# Patient Record
(demographics unavailable — no encounter records)

---

## 2024-11-13 NOTE — HISTORY OF PRESENT ILLNESS
[FreeTextEntry1] : Maggie is a 65-year-old female presenting today with a personal history of bilateral breast implants.  Most recent bilateral mammography and ultrasound was performed at [facility] on March 10, 2023, BI-RADS 2, heterogeneously dense breast.  More recently, on September 24, 2024 the patient underwent bilateral breast MRI showing an intact right breast saline implant, however left breast ruptured retro-pectoral saline implant was found. Her bilaterally retropectoral saline implants were placed in 1995. She presents today to discuss her recent MRI in the next steps. She has no breast complaints today. She does SBE. She has not noticed a change in her breast or a breast lump. She has not noticed a change in her nipple or nipple area. She has not noticed a change in the skin of the breast. She is not experiencing nipple discharge. She is experiencing left breast pain/discomfort, sporadic She has not noticed a lump or lymph node under the armpit.   BREAST CANCER RISK FACTORS Menarche: 12 LMP: 2000 Menopause: post  Grav: 0  Para: 0 Age at first live birth: n/a Nursed: n/a Hysterectomy: no  Oophorectomy: no  OCP: yes for 20 years stopped 30 years ago  HRT: no  Last pap/pelvic exam: 2023 WNL  Related family history: maternal cousin uterine cancer 63yo Ashkenazi: no  Eduardo Varghese v8 risk assessment: 7.2% Genetic testing: no  Bra size: 36C   Last mammogram: 03/10/2023     Location: [facility]  Report reviewed.     Images reviewed on PACS Results: BIRADS 2  The breast are heterogeneously dense. no evidence of malignancy    Last ultrasound: 03/10/2023      Location: [facility]  Report reviewed.     Images reviewed on PACS Results: BIRADS 2    no evidence of malignancy    Last MRI: 09/24/02024    Location: [facility]  Report reviewed. Results: BIRADS 2  Left breast ruptured retropectoral saline implant

## 2024-11-13 NOTE — ASSESSMENT
[FreeTextEntry1] : 66 yo female with ruptured left breast saline implant plan mg/sono asap--discussed that she is overdue explained to patient that saline implants do not require MRI screening referral to plastic surgery was given  reviewed her risk status, she is not high risk however, given her density I am recommending contrast-enhanced mammogram in future We reviewed risk reduction strategies including maintaining a BMI <25, limiting red meat intake and alcoholic beverages to 3 per week and exercise (150 min/ week low intensity or 75 min/week high intensity). And maintaining a normal vitamin D level and stress management strategies. plan JUAN/SONO asap since she is not high risk, she can see me as needed She knows to call or return sooner should any concerns or questions arise.

## 2024-11-13 NOTE — HISTORY OF PRESENT ILLNESS
[FreeTextEntry1] : Maggie is a 65-year-old female presenting today with a personal history of bilateral breast implants.  Most recent bilateral mammography and ultrasound was performed at [facility] on March 10, 2023, BI-RADS 2, heterogeneously dense breast.  More recently, on September 24, 2024 the patient underwent bilateral breast MRI showing an intact right breast saline implant, however left breast ruptured retro-pectoral saline implant was found. Her bilaterally retropectoral saline implants were placed in 1995. She presents today to discuss her recent MRI in the next steps. She has no breast complaints today. She does SBE. She has not noticed a change in her breast or a breast lump. She has not noticed a change in her nipple or nipple area. She has not noticed a change in the skin of the breast. She is not experiencing nipple discharge. She is experiencing left breast pain/discomfort, sporadic She has not noticed a lump or lymph node under the armpit.   BREAST CANCER RISK FACTORS Menarche: 12 LMP: 2000 Menopause: post  Grav: 0  Para: 0 Age at first live birth: n/a Nursed: n/a Hysterectomy: no  Oophorectomy: no  OCP: yes for 20 years stopped 30 years ago  HRT: no  Last pap/pelvic exam: 2023 WNL  Related family history: maternal cousin uterine cancer 63yo Ashkenazi: no  Eduardo Varghese v8 risk assessment: 7.2% Genetic testing: no  Bra size: 36C   Last mammogram: 03/10/2023     Location: [facility]  Report reviewed.     Images reviewed on PACS Results: BIRADS 2  The breast are heterogeneously dense. no evidence of malignancy    Last ultrasound: 03/10/2023      Location: [facility]  Report reviewed.     Images reviewed on PACS Results: BIRADS 2    no evidence of malignancy    Last MRI: 09/24/02024    Location: [facility]  Report reviewed. Results: BIRADS 2  Left breast ruptured retropectoral saline implant

## 2024-11-13 NOTE — PHYSICAL EXAM
[Normocephalic] : normocephalic [Atraumatic] : atraumatic [Supple] : supple [No Supraclavicular Adenopathy] : no supraclavicular adenopathy [Examined in the supine and seated position] : examined in the supine and seated position [Asymmetrical] : asymmetrical [No dominant masses] : no dominant masses in right breast  [No dominant masses] : no dominant masses left breast [No Nipple Retraction] : no left nipple retraction [No Nipple Discharge] : no left nipple discharge [No Axillary Lymphadenopathy] : no left axillary lymphadenopathy [No Edema] : no edema [No Rashes] : no rashes [No Ulceration] : no ulceration [de-identified] : implant in place soft [de-identified] : soft, smaller than right side

## 2024-11-13 NOTE — CONSULT LETTER
[Dear  ___] : Dear ~BARBARA, [( Thank you for referring [unfilled] for consultation for _____ )] : Thank you for referring [unfilled] for consultation for [unfilled] [Please see my note below.] : Please see my note below. [Consult Closing:] : Thank you very much for allowing me to participate in the care of this patient.  If you have any questions, please do not hesitate to contact me. [Sincerely,] : Sincerely, [FreeTextEntry3] : Ana Freeman [place] [address] [DrSasha  ___] : Dr. COLBY

## 2024-11-13 NOTE — CONSULT LETTER
[Dear  ___] : Dear ~BARBARA, [( Thank you for referring [unfilled] for consultation for _____ )] : Thank you for referring [unfilled] for consultation for [unfilled] [Please see my note below.] : Please see my note below. [Consult Closing:] : Thank you very much for allowing me to participate in the care of this patient.  If you have any questions, please do not hesitate to contact me. [Sincerely,] : Sincerely, [FreeTextEntry3] : Ana Freeman [place] [address] [DrSasha  ___] : Dr. COLBY

## 2024-11-13 NOTE — ASSESSMENT
[FreeTextEntry1] : 64 yo female with ruptured left breast saline implant plan mg/sono asap--discussed that she is overdue explained to patient that saline implants do not require MRI screening referral to plastic surgery was given  reviewed her risk status, she is not high risk however, given her density I am recommending contrast-enhanced mammogram in future We reviewed risk reduction strategies including maintaining a BMI <25, limiting red meat intake and alcoholic beverages to 3 per week and exercise (150 min/ week low intensity or 75 min/week high intensity). And maintaining a normal vitamin D level and stress management strategies. plan JUAN/SONO asap since she is not high risk, she can see me as needed She knows to call or return sooner should any concerns or questions arise.

## 2024-11-13 NOTE — CONSULT LETTER
[Dear  ___] : Dear ~BARBARA, [( Thank you for referring [unfilled] for consultation for _____ )] : Thank you for referring [unfilled] for consultation for [unfilled] [Please see my note below.] : Please see my note below. [Consult Closing:] : Thank you very much for allowing me to participate in the care of this patient.  If you have any questions, please do not hesitate to contact me. [Sincerely,] : Sincerely, [FreeTextEntry3] : Ana [place] [address] [DrSasha  ___] : Dr. COLBY

## 2024-11-13 NOTE — REVIEW OF SYSTEMS
[Breast Pain] : breast pain [Negative] : Heme/Lymph [Skin Lesions] : no skin lesions [Skin Wound] : no skin wound [Itching] : no itching [Change In A Mole] : no change in a mole [Breast Lump] : no breast lump

## 2024-11-13 NOTE — HISTORY OF PRESENT ILLNESS
[FreeTextEntry1] : Maggie is a 65-year-old female presenting today with a personal history of bilateral breast implants.  Most recent bilateral mammography and ultrasound was performed at [facility] on March 10, 2023, BI-RADS 2, heterogeneously dense breast.  More recently, on September 24, 2024 the patient underwent bilateral breast MRI showing an intact right breast saline implant, however left breast ruptured retro-pectoral saline implant was found. Her bilaterally retropectoral saline implants were placed in 1995. She presents today to discuss her recent MRI in the next steps. She has no breast complaints today. She does SBE. She has not noticed a change in her breast or a breast lump. She has not noticed a change in her nipple or nipple area. She has not noticed a change in the skin of the breast. She is not experiencing nipple discharge. She is experiencing left breast pain/discomfort, sporadic She has not noticed a lump or lymph node under the armpit.   BREAST CANCER RISK FACTORS Menarche: 12 LMP: 2000 Menopause: post  Grav: 0  Para: 0 Age at first live birth: n/a Nursed: n/a Hysterectomy: no  Oophorectomy: no  OCP: yes for 20 years stopped 30 years ago  HRT: no  Last pap/pelvic exam: 2023 WNL  Related family history: maternal cousin uterine cancer 61yo Ashkenazi: no  Eduardo Varghese v8 risk assessment: 7.2% Genetic testing: no  Bra size: 36C   Last mammogram: 03/10/2023     Location: [facility]  Report reviewed.     Images reviewed on PACS Results: BIRADS 2  The breast are heterogeneously dense. no evidence of malignancy    Last ultrasound: 03/10/2023      Location: [facility]  Report reviewed.     Images reviewed on PACS Results: BIRADS 2    no evidence of malignancy    Last MRI: 09/24/02024    Location: [facility]  Report reviewed. Results: BIRADS 2  Left breast ruptured retropectoral saline implant

## 2024-11-13 NOTE — PHYSICAL EXAM
[Normocephalic] : normocephalic [Atraumatic] : atraumatic [Supple] : supple [No Supraclavicular Adenopathy] : no supraclavicular adenopathy [Examined in the supine and seated position] : examined in the supine and seated position [Asymmetrical] : asymmetrical [No dominant masses] : no dominant masses in right breast  [No dominant masses] : no dominant masses left breast [No Nipple Retraction] : no left nipple retraction [No Nipple Discharge] : no left nipple discharge [No Axillary Lymphadenopathy] : no left axillary lymphadenopathy [No Edema] : no edema [No Rashes] : no rashes [No Ulceration] : no ulceration [de-identified] : implant in place soft [de-identified] : soft, smaller than right side

## 2024-11-13 NOTE — PHYSICAL EXAM
[Normocephalic] : normocephalic [Atraumatic] : atraumatic [Supple] : supple [No Supraclavicular Adenopathy] : no supraclavicular adenopathy [Examined in the supine and seated position] : examined in the supine and seated position [Asymmetrical] : asymmetrical [No dominant masses] : no dominant masses in right breast  [No dominant masses] : no dominant masses left breast [No Nipple Retraction] : no left nipple retraction [No Nipple Discharge] : no left nipple discharge [No Axillary Lymphadenopathy] : no left axillary lymphadenopathy [No Edema] : no edema [No Rashes] : no rashes [No Ulceration] : no ulceration [de-identified] : implant in place soft [de-identified] : soft, smaller than right side